# Patient Record
Sex: FEMALE | Race: BLACK OR AFRICAN AMERICAN
[De-identification: names, ages, dates, MRNs, and addresses within clinical notes are randomized per-mention and may not be internally consistent; named-entity substitution may affect disease eponyms.]

---
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
## 2019-07-13 ENCOUNTER — HOSPITAL ENCOUNTER (EMERGENCY)
Dept: HOSPITAL 63 - ER | Age: 19
LOS: 1 days | Discharge: HOME | End: 2019-07-14
Payer: SELF-PAY

## 2019-07-13 VITALS — DIASTOLIC BLOOD PRESSURE: 60 MMHG | SYSTOLIC BLOOD PRESSURE: 103 MMHG

## 2019-07-13 VITALS — HEIGHT: 68 IN | WEIGHT: 134 LBS | BODY MASS INDEX: 20.31 KG/M2

## 2019-07-13 DIAGNOSIS — Z3A.00: ICD-10-CM

## 2019-07-13 DIAGNOSIS — O20.0: Primary | ICD-10-CM

## 2019-07-13 LAB
ANION GAP SERPL CALC-SCNC: 9 MMOL/L (ref 6–14)
APTT PPP: YELLOW S
BACTERIA #/AREA URNS HPF: 0 /HPF
BASOPHILS # BLD AUTO: 0.1 X10^3/UL (ref 0–0.2)
BASOPHILS NFR BLD: 1 % (ref 0–3)
BILIRUB UR QL STRIP: (no result)
CA-I SERPL ISE-MCNC: 6 MG/DL (ref 7–20)
CALCIUM SERPL-MCNC: 9.1 MG/DL (ref 8.5–10.1)
CHLORIDE SERPL-SCNC: 108 MMOL/L (ref 98–107)
CO2 SERPL-SCNC: 26 MMOL/L (ref 21–32)
CREAT SERPL-MCNC: 0.7 MG/DL (ref 0.6–1)
EOSINOPHIL NFR BLD: 0.1 X10^3/UL (ref 0–0.7)
EOSINOPHIL NFR BLD: 1 % (ref 0–3)
ERYTHROCYTE [DISTWIDTH] IN BLOOD BY AUTOMATED COUNT: 13.9 % (ref 11.5–14.5)
FIBRINOGEN PPP-MCNC: CLEAR MG/DL
GFR SERPLBLD BASED ON 1.73 SQ M-ARVRAT: 130.4 ML/MIN
GLUCOSE SERPL-MCNC: 90 MG/DL (ref 70–99)
GLUCOSE UR STRIP-MCNC: (no result) MG/DL
HCT VFR BLD CALC: 41 % (ref 36–47)
HGB BLD-MCNC: 13.7 G/DL (ref 12–15.5)
LYMPHOCYTES # BLD: 2.1 X10^3/UL (ref 1–4.8)
LYMPHOCYTES NFR BLD AUTO: 28 % (ref 24–48)
MCH RBC QN AUTO: 30 PG (ref 25–35)
MCHC RBC AUTO-ENTMCNC: 33 G/DL (ref 31–37)
MCV RBC AUTO: 91 FL (ref 79–100)
MONO #: 0.5 X10^3/UL (ref 0–1.1)
MONOCYTES NFR BLD: 7 % (ref 0–9)
NEUT #: 4.8 X10^3UL (ref 1.8–7.7)
NEUTROPHILS NFR BLD AUTO: 64 % (ref 31–73)
NITRITE UR QL STRIP: (no result)
PLATELET # BLD AUTO: 286 X10^3/UL (ref 140–400)
POTASSIUM SERPL-SCNC: 4 MMOL/L (ref 3.5–5.1)
RBC # BLD AUTO: 4.51 X10^6/UL (ref 3.5–5.4)
RBC #/AREA URNS HPF: (no result) /HPF (ref 0–2)
SODIUM SERPL-SCNC: 143 MMOL/L (ref 136–145)
SP GR UR STRIP: >=1.03
SQUAMOUS #/AREA URNS LPF: (no result) /LPF
UROBILINOGEN UR-MCNC: 0.2 MG/DL
WBC # BLD AUTO: 7.6 X10^3/UL (ref 4–11)
WBC #/AREA URNS HPF: 0 /HPF (ref 0–4)

## 2019-07-13 PROCEDURE — 84702 CHORIONIC GONADOTROPIN TEST: CPT

## 2019-07-13 PROCEDURE — 81001 URINALYSIS AUTO W/SCOPE: CPT

## 2019-07-13 PROCEDURE — 85025 COMPLETE CBC W/AUTO DIFF WBC: CPT

## 2019-07-13 PROCEDURE — 87591 N.GONORRHOEAE DNA AMP PROB: CPT

## 2019-07-13 PROCEDURE — 81025 URINE PREGNANCY TEST: CPT

## 2019-07-13 PROCEDURE — 99285 EMERGENCY DEPT VISIT HI MDM: CPT

## 2019-07-13 PROCEDURE — 86900 BLOOD TYPING SEROLOGIC ABO: CPT

## 2019-07-13 PROCEDURE — 86901 BLOOD TYPING SEROLOGIC RH(D): CPT

## 2019-07-13 PROCEDURE — 80048 BASIC METABOLIC PNL TOTAL CA: CPT

## 2019-07-13 PROCEDURE — 36415 COLL VENOUS BLD VENIPUNCTURE: CPT

## 2019-07-13 PROCEDURE — 87491 CHLMYD TRACH DNA AMP PROBE: CPT

## 2019-07-13 PROCEDURE — 76817 TRANSVAGINAL US OBSTETRIC: CPT

## 2019-07-13 NOTE — PHYS DOC
Past History


Past Medical History:  No Pertinent History


Past Surgical History:  Other


Additional Past Surgical Histo:  left arm muscle repair


Smoking:  Non-smoker


Alcohol Use:  None


Drug Use:  Marijuana





Adult General


Chief Complaint


Chief Complaint:  VAGINAL BLEEDING





HPI


HPI





Patient is a 18-year-old female presents with vaginal bleeding that started 

approximately noon today. She reports that it is about the amount of a menstrual

cycle. Patient took a home pregnancy test 2 days ago that was positive, and 

repeated the test today which was also positive. Her last menstrual period was 

Kristal 15. She is sexually active with one partner. She has a previous history of 

3 prior miscarriages early on in the pregnancy, along with 1 of them being an 

ectopic pregnancy. She has no previous abdominal or pelvic surgery, the ectopic 

was treated with "2 injections". She does not know her blood type. She denies 

any pain. Nothing makes the symptoms better or worse.[]





Review of Systems


Review of Systems





Constitutional: Denies fever or chills []


Eyes: Denies change in visual acuity, redness, or eye pain []


HENT: Denies nasal congestion or sore throat []


Respiratory: Denies cough or shortness of breath []


Cardiovascular: No chest pain or palpitations[]


GI: Denies abdominal pain, nausea, vomiting, bloody stools or diarrhea []


: Denies dysuria or hematuria , see history of present illness[]


Musculoskeletal: Denies back pain or joint pain []


Integument: Denies rash or skin lesions []


Neurologic: Denies headache, focal weakness or sensory changes []


Endocrine: Denies polyuria or polydipsia []





All other systems were reviewed and found to be within normal limits, except as 

documented in this note.





Current Medications


Current Medications





Current Medications








 Medications


  (Trade)  Dose


 Ordered  Sig/Luli  Start Time


 Stop Time Status Last Admin


Dose Admin


 


 Sodium Chloride  500 ml @ 


 500 mls/hr  Q1H  7/13/19 21:30


   UNV  














Allergies


Allergies





Allergies








Coded Allergies Type Severity Reaction Last Updated Verified


 


  No Known Drug Allergies    11/11/14 No











Physical Exam


Physical Exam





Constitutional: Well developed, well nourished, no acute distress, non-toxic 

appearance. []


HENT: Normocephalic, atraumatic, bilateral external ears normal, oropharynx alec

st, no oral exudates, nose normal. []


Eyes: PERRLA, EOMI, conjunctiva normal, no discharge. [] 


Neck: Normal range of motion, no tenderness, supple, no stridor. [] 


Cardiovascular:Heart rate regular rhythm, no murmur []


Lungs & Thorax:  Bilateral breath sounds clear to auscultation []


Abdomen: Bowel sounds normal, soft, no tenderness, no masses, no pulsatile 

masses.


Pelvic exam: External genitalia: Normal external genitalia, no abscesses noted. 

Vaginal vault: A small amount of blood in the vault. Cervix: Nulliparous os, no 

cervical motion tenderness, closed, small amount of bleeding from the os [] 


Skin: Warm, dry, no erythema, no rash. [] 


Back: No tenderness, no CVA tenderness. [] 


Extremities: No tenderness, no cyanosis, no clubbing, ROM intact, no edema. [] 


Neurologic: Alert and oriented X 3, normal motor function, normal sensory 

function, no focal deficits noted. []


Psychologic: Affect normal, judgement normal, mood normal. []





Current Patient Data


Vital Signs





                                   Vital Signs








  Date Time  Temp Pulse Resp B/P (MAP) Pulse Ox O2 Delivery O2 Flow Rate FiO2


 


7/13/19 21:05 98.2 85 18  99 Room Air  








Lab Results





                                Laboratory Tests








Test


 7/13/19


20:55 7/13/19


21:09


 


Urine Collection Type Unknown   


 


Urine Color Yellow   


 


Urine Clarity Clear   


 


Urine pH 6.0   


 


Urine Specific Gravity >=1.030   


 


Urine Protein


 Neg


(NEG-TRACE) 





 


Urine Glucose (UA)


 Neg mg/dL


(NEG) 





 


Urine Ketones (Stick)


 Trace mg/dL


(NEG) 





 


Urine Blood Mod (NEG)   


 


Urine Nitrite Neg (NEG)   


 


Urine Bilirubin Neg (NEG)   


 


Urine Urobilinogen Dipstick


 0.2 mg/dL (0.2


mg/dL) 





 


Urine Leukocyte Esterase Neg (NEG)   


 


Urine RBC


 1-2 /HPF (0-2)


 





 


Urine WBC 0 /HPF (0-4)   


 


Urine Squamous Epithelial


Cells Occ /LPF  


 





 


Urine Bacteria


 0 /HPF (0-FEW)


 





 


POC Urine HCG, Qualitative


 


 hcg positive


(Negative)











EKG


EKG


[]





Radiology/Procedures


Radiology/Procedures


PROCEDURE: PREGNANCY TRANSVAGINAL





Ultrasound pregnancy first trimester and transvaginal ultrasound pregnancy


 


HISTORY: Vaginal bleeding previous ectopic


 


Sonographic examination appearance was performed by transabdominal and 


endovaginal technique and multiple static images were obtained


 


Ultrasound pregnancy first semester transabdominal:


 


Uterus and ovaries are not well visualized.


 


Transvaginal ultrasound pregnancy:


 


The uterus is homogeneous. The endometrium measures up to 5.3 mm in 


thickness in the lower uterine segment. There is a hypoechoic area within 


the endometrium measures 7 x 5 x 3 mm. There is a small amount of fluid in


the cervix. The uterus is retroverted.


 


There is a trace of free fluid adjacent to the right ovary. The right 


ovary appears normal with normal blood flow and measures 2.8 x 1.7 x 2.5 


cm. The left ovary appears normal normal blood flow measures 2.3 x 2.5 x 


1.0 cm. Tubular structure medial left ovary appears to be collapsed small 


bowel.


 


IMPRESSION:


 


Small structure in the endometrium could be blood products. Recommend 


correlation with serial quantitative beta-hCG. If the pregnancy continues 


a short-term follow-up ultrasound should be performed in order to document


an intrauterine pregnancy and thereby exclude possible ectopic pregnancy.[]





Course & Med Decision Making


Course & Med Decision Making


Pertinent Labs and Imaging studies reviewed. (See chart for details)





ED course: Patient arrived, was placed in bed, and tolerated exam well. She was 

transported to and from [facility] with any complications. After the return of 

laboratory and imaging studies, these were discussed with the patient voiced 

understanding. All questions were answered. She was discharged in improved 

condition.





Medical decision making: This is an early pregnancy with vaginal bleeding. There

 is no evidence of an ectopic at this time, however quantitative pregnancy test 

is very low. This will need close follow-up. Her blood type is B+, there is no 

indication for Rh immunoglobulin. No anemia, no evidence of urinary tract 

infection nor significant electrolyte abnormality.[]





Dragon Disclaimer


Dragon Disclaimer


This electronic medical record was generated, in whole or in part, using a voice

 recognition dictation system.





Departure


Departure:


Impression:  


   Primary Impression:  


   Threatened miscarriage


Disposition:  01 HOME, SELF-CARE


Condition:  IMPROVED


Referrals:  


PCP,MIGUEL (PCP)


Patient Instructions:  Threatened Miscarriage





Additional Instructions:  


Follow-up with your regular doctor in 2 days. If you do not have regular doctor 

a list of local clinics will be provided for you. You need to have a repeat 

quantitative pregnancy test performed in 2-3 days. Your quantitative pregnancy 

test level was 45 today. In a normal pregnancy this level will double every 2-3 

days. It needs to be followed to determine the trend.





Your blood type is B+.





Return to the ER if worsening bleeding or any other concerns.











BENITO PARISI DO          Jul 13, 2019 21:40

## 2019-07-13 NOTE — RAD
Ultrasound pregnancy first trimester and transvaginal ultrasound pregnancy

 

HISTORY: Vaginal bleeding previous ectopic

 

Sonographic examination appearance was performed by transabdominal and 

endovaginal technique and multiple static images were obtained

 

Ultrasound pregnancy first semester transabdominal:

 

Uterus and ovaries are not well visualized.

 

Transvaginal ultrasound pregnancy:

 

The uterus is homogeneous. The endometrium measures up to 5.3 mm in 

thickness in the lower uterine segment. There is a hypoechoic area within 

the endometrium measures 7 x 5 x 3 mm. There is a small amount of fluid in

the cervix. The uterus is retroverted.

 

There is a trace of free fluid adjacent to the right ovary. The right 

ovary appears normal with normal blood flow and measures 2.8 x 1.7 x 2.5 

cm. The left ovary appears normal normal blood flow measures 2.3 x 2.5 x 

1.0 cm. Tubular structure medial left ovary appears to be collapsed small 

bowel.

 

IMPRESSION:

 

Small structure in the endometrium could be blood products. Recommend 

correlation with serial quantitative beta-hCG. If the pregnancy continues 

a short-term follow-up ultrasound should be performed in order to document

an intrauterine pregnancy and thereby exclude possible ectopic pregnancy.

 

Electronically signed by: Colt Joseph III, MD (7/13/2019 11:24 PM) 

[facility]

## 2022-05-21 ENCOUNTER — HOSPITAL ENCOUNTER (EMERGENCY)
Dept: HOSPITAL 61 - ER | Age: 22
Discharge: HOME | End: 2022-05-21
Payer: SELF-PAY

## 2022-05-21 VITALS — DIASTOLIC BLOOD PRESSURE: 70 MMHG | SYSTOLIC BLOOD PRESSURE: 118 MMHG

## 2022-05-21 VITALS — BODY MASS INDEX: 19.28 KG/M2 | WEIGHT: 127.21 LBS | HEIGHT: 68 IN

## 2022-05-21 DIAGNOSIS — N30.01: Primary | ICD-10-CM

## 2022-05-21 LAB
BACTERIA #/AREA URNS HPF: (no result) /HPF
RBC #/AREA URNS HPF: >40 /HPF (ref 0–2)

## 2022-05-21 PROCEDURE — 87086 URINE CULTURE/COLONY COUNT: CPT

## 2022-05-21 PROCEDURE — 99283 EMERGENCY DEPT VISIT LOW MDM: CPT

## 2022-05-21 PROCEDURE — 81025 URINE PREGNANCY TEST: CPT

## 2022-05-21 PROCEDURE — 81001 URINALYSIS AUTO W/SCOPE: CPT

## 2022-05-21 NOTE — PHYS DOC
Past Medical History


Past Medical History:  No Pertinent History


 (MARIAH CABRAL)


Past Surgical History:  No Surgical History


 (MARIAH CABRAL)


Smoking Status:  Never Smoker


Alcohol Use:  None


Drug Use:  None


 (MARIAH CABRAL)





General Adult


EDM:


Chief Complaint:  PAIN ON URINATION





HPI:


HPI:





Patient is a 21  year old female who presents with vaginal itching and white 

discharge for the last month, intermittent urinary burning with urination.  She 

states she went to her doctor who gave her Diflucan but did not seem to help.  

History is a Lap-Band, yeast infection, tonsillectomy, obesity.  She states that

she was sexually active about a month ago when all this started.  Denies 

abdominal pain, nausea, vomiting, diarrhea, fever, back pain, chest pain, 

shortness of air, numbness or tingling, focal weakness.


 (CINDY CHRISTIAN APRN)


HPI:


Patient is a 21-year-old female that presents today with painful urination.  

Patient states that over the last 2 days she has had increased pain and 

frequency in urination.  Patient denies chest pain, abdominal pain, back pain, 

fever or chills, nausea vomiting or diarrhea.  Patient denies vaginal bleeding 

vaginal discharge as well.


 (MARIAH CABRAL)


Review of Systems:


Review of Systems:


Constitutional:   Denies fever or chills. []


Eyes:   Denies change in visual acuity. []


HENT:   Denies nasal congestion or sore throat. [] 


Respiratory:   Denies cough or shortness of breath. [] 


Cardiovascular:   Denies chest pain or edema. [] 


GI:   Denies abdominal pain, nausea, vomiting, bloody stools or diarrhea. [] 


:  Denies dysuria. +Vaginal discharge, +vaginal itching[] 


Musculoskeletal:   Denies back pain or joint pain. [] 


Integument:   Denies rash. [] 


Neurologic:   Denies headache, focal weakness or sensory changes. [] 


Endocrine:   Denies polyuria or polydipsia. [] 


Lymphatic:  Denies swollen glands. [] 


Psychiatric:  Denies depression or anxiety. []


 (CINDY CHRISTIAN)


Review of Systems:


See HPI for review of systems


 (MARIAH CABRAL)


Heart Score:


C/O Chest Pain:  No


 (BAFUS,CINDY M APRN)


C/O Chest Pain:  No


 (MARIAH CABRAL APRN)





Physical Exam:


PE:





Constitutional: Well developed, well nourished, no acute distress, non-toxic 

appearance. []


HENT: Normocephalic, atraumatic, bilateral external ears normal, oropharynx 

moist, no oral exudates, nose normal. []


Eyes: PERRLA, EOMI, conjunctiva normal, no discharge. [] 


Neck: Normal range of motion, no tenderness, supple, no stridor. [] 


Cardiovascular:Heart rate regular rhythm, no murmur []


Lungs & Thorax:  Bilateral breath sounds clear to auscultation []


Abdomen: Bowel sounds normal, soft, no tenderness, no masses, no pulsatile 

masses. [] 


Skin: Warm, dry, no erythema, no rash. [] 


Back: No tenderness, no CVA tenderness. [] 


Extremities: No tenderness, no cyanosis, no clubbing, ROM intact, no edema. [] 


Neurologic: Alert and oriented X 3, normal motor function, normal sensory 

function, no focal deficits noted. []


Psychologic: Affect normal, judgement normal, mood normal. []





**Normal Physical Exam


 (BAFUS,CINDY M APRN)


PE:


Constitutional: Well developed, well nourished, no acute distress, non-toxic 

appearance. []


HENT: Normocephalic, atraumatic, bilateral external ears normal, oropharynx 

moist, no oral exudates, nose normal. []


Eyes: PERRLA, EOMI, conjunctiva normal, no discharge. [] 


Neck: Normal range of motion, no tenderness, supple, no stridor. [] 


Cardiovascular:Heart rate regular rhythm, no murmur []


Lungs & Thorax:  Bilateral breath sounds clear to auscultation []


Abdomen: Bowel sounds normal, soft, no tenderness, no masses, no pulsatile 

masses. [] 


Skin: Warm, dry, no erythema, no rash. [] 


Back: No tenderness, no CVA tenderness. [] 


Extremities: No tenderness, no cyanosis, no clubbing, ROM intact, no edema. [] 


Neurologic: Alert and oriented X 3, normal motor function, normal sensory func

tion, no focal deficits noted. []


Psychologic: Affect normal, judgement normal, mood normal. []


 (MARIAH CABRAL)





Current Patient Data:


Vital Signs:





Vital Signs








  Date Time  Temp Pulse Resp B/P (MAP) Pulse Ox O2 Delivery O2 Flow Rate FiO2


 


5/21/22 18:27 97.2 66 18 108/68 (81) 98 Room Air  





 97.2       








 (MARIAH CABRAL)


EKG:


EKG:


[]


 (CINDY CHRISTIAN)





Radiology/Procedures:


Radiology/Procedures:


[]


 (CINDY CHRISTIAN)





Dragon Disclaimer:


Dragon Disclaimer:


This electronic medical record was generated, in whole or in part, using a voice

 recognition dictation system.


 (CINDY CHRISTIAN)











CINDY CHRISTIAN            May 21, 2022 18:13


MARIAH CABRAL       May 21, 2022 18:28

## 2022-05-21 NOTE — PHYS DOC
Past Medical History


Past Medical History:  No Pertinent History


Past Surgical History:  No Surgical History


Smoking Status:  Never Smoker


Alcohol Use:  None


Drug Use:  None





General Adult


EDM:


Chief Complaint:  PAIN ON URINATION





HPI:


HPI:


Patient is a 21-year-old female that presents today with painful urination.  P

atient states that over the last 2 days she has had increased pain and frequency

in urination.  Patient denies chest pain, abdominal pain, back pain, fever or 

chills, nausea vomiting or diarrhea.  Patient denies vaginal bleeding vaginal 

discharge as well.





Review of Systems:


Review of Systems:


Constitutional:   Denies fever or chills. []


Eyes:   Denies change in visual acuity. []


HENT:   Denies nasal congestion or sore throat. [] 


Respiratory:   Denies cough or shortness of breath. [] 


Cardiovascular:   Denies chest pain or edema. [] 


GI:   Denies abdominal pain, nausea, vomiting, bloody stools or diarrhea. [] 


:  dysuria. [] 


Musculoskeletal:   Denies back pain or joint pain. [] 


Integument:   Denies rash. [] 


Neurologic:   Denies headache, focal weakness or sensory changes. [] 


Endocrine:   Denies polyuria or polydipsia. [] 


Lymphatic:  Denies swollen glands. [] 


Psychiatric:  Denies depression or anxiety. []





Heart Score:


C/O Chest Pain:  No


Risk Factors:


Risk Factors:  DM, Current or recent (<one month) smoker, HTN, HLP, family 

history of CAD, obesity.


Risk Scores:


Score 0 - 3:  2.5% MACE over next 6 weeks - Discharge Home


Score 4 - 6:  20.3% MACE over next 6 weeks - Admit for Clinical Observation


Score 7 - 10:  72.7% MACE over next 6 weeks - Early Invasive Strategies





Physical Exam:


PE:





Constitutional: Well developed, well nourished, no acute distress, non-toxic 

appearance. []


HENT: Normocephalic, atraumatic, bilateral external ears normal, oropharynx 

moist, no oral exudates, nose normal. []


Eyes: PERRLA, EOMI, conjunctiva normal, no discharge. [] 


Neck: Normal range of motion, no tenderness, supple, no stridor. [] 


Cardiovascular:Heart rate regular rhythm, no murmur []


Lungs & Thorax:  Bilateral breath sounds clear to auscultation []


Abdomen: Bowel sounds normal, soft, no tenderness, no masses, no pulsatile 

masses. [] 


Skin: Warm, dry, no erythema, no rash. [] 


Back: No tenderness, no CVA tenderness. [] 


Extremities: No tenderness, no cyanosis, no clubbing, ROM intact, no edema. [] 


Neurologic: Alert and oriented X 3, normal motor function, normal sensory 

function, no focal deficits noted. []


Psychologic: Affect normal, judgement normal, mood normal. []





Current Patient Data:


Labs:





                                Laboratory Tests








Test


 5/21/22


18:14 5/21/22


18:27


 


Urine Collection Type Unknown   


 


Urine Color (Auto) Yellow   


 


Urine Turbidity Hazy   


 


Urine pH (Auto)


 5.5 (<5.0-8.0)


 





 


Urine Specific Gravity


 1.033


(1.000-1.030) 





 


Urine Protein (Auto)


 70 mg/dL


(Negative) 





 


Urine Glucose (Auto)(UA)


 Negative mg/dL


(Negative) 





 


Urine Ketones (Auto)


 20 mg/dL


(Negative) 





 


Urine Blood (Auto)


 Large


(Negative) 





 


Urine Nitrite


 Negative


(Negative) 





 


Urine Bilirubin (Auto)


 Negative


(Negative) 





 


Urine Urobilinogen (Auto)


 Normal mg/dL


(Normal) 





 


Urine Leukocyte Esterase


(Auto) Small


(Negative) 





 


Urine RBC


 >40 /HPF (0-2)


 





 


Urine WBC


 11-20 /HPF


(0-4) 





 


Urine Squamous Epithelial


Cells Many /LPF  


 





 


Urine Bacteria


 Few /HPF


(0-FEW) 





 


Urine Mucus Marked /LPF   


 


POC Urine HCG, Qualitative


 


 Hcg negative


(Negative)








Vital Signs:





                                   Vital Signs








  Date Time  Temp Pulse Resp B/P (MAP) Pulse Ox O2 Delivery O2 Flow Rate FiO2


 


5/21/22 18:27 97.2 66 18 108/68 (81) 98 Room Air  





 97.2       











EKG:


EKG:


[]





Radiology/Procedures:


Radiology/Procedures:


[]





Course & Med Decision Making:


Course & Med Decision Making


Pertinent Labs and Imaging studies reviewed. (See chart for details)





1900 I reviewed radiological results with patient did inform her that she does 

have a urinary tract infection, patient is nontoxic in appearance and afebrile 

at this time we will treat on an outpatient basis with Macrobid and Pyridium.  

Patient will be instructed to follow-up with her primary care physician or one 

of the listed clinics below for further evaluation and management of her 

condition.  Patient verbalizes understanding of this and agreeable with plan of 

care.





Dragon Disclaimer:


Dragon Disclaimer:


This electronic medical record was generated, in whole or in part, using a voice

 recognition dictation system.





Departure


Departure


Impression:  


   Primary Impression:  


   Urinary tract infection


   Qualified Codes:  N30.01 - Acute cystitis with hematuria


Disposition:  01 HOME / SELF CARE / HOMELESS


Condition:  STABLE


Referrals:  


NO PCP (PCP)


Patient Instructions:  Urinary Tract Infection





Additional Instructions:  


Macrobid 100 mg take 1 tablet twice daily for 7 full days for urinary tract 

infection


Pyridium 100 mg take 1 tablet 3 times daily for 2 days to help with UTI 

symptoms.  FYI this will cause your urine to turn bright orange


Tylenol and/or ibuprofen as needed for fever and pain


Follow-up with your primary care physician or one of the listed clinics below 

for further evaluation and management of her UTI


Return here to the emergency department should you have increased pain in your 

abdomen, nausea vomiting that does not allow you to take your antibiotics as 

prescribed, or development of a fever while on the antibiotics.


Scripts


Nitrofurantoin Monohyd/M-Cryst (MACROBID 100 MG CAPSULE) 100 Mg Capsule


1 CAP PO BID for 7 Days, #14 CAP 0 Refills


   Prov: MARIAH CABRAL APRN         5/21/22 


Phenazopyridine Hcl (PYRIDIUM) 100 Mg Tablet


1 TAB PO TID for urinary discomfort for 2 Days, #6 TAB 0 Refills


   Prov: MARIAH CABRAL APRN         5/21/22











MARIAH CABRAL       May 21, 2022 19:10